# Patient Record
Sex: MALE | Race: WHITE | NOT HISPANIC OR LATINO
[De-identification: names, ages, dates, MRNs, and addresses within clinical notes are randomized per-mention and may not be internally consistent; named-entity substitution may affect disease eponyms.]

---

## 2019-10-22 ENCOUNTER — APPOINTMENT (OUTPATIENT)
Dept: VASCULAR SURGERY | Facility: CLINIC | Age: 69
End: 2019-10-22
Payer: MEDICARE

## 2019-10-22 VITALS
DIASTOLIC BLOOD PRESSURE: 71 MMHG | BODY MASS INDEX: 33.66 KG/M2 | OXYGEN SATURATION: 96 % | TEMPERATURE: 98.8 F | HEIGHT: 73 IN | WEIGHT: 254 LBS | HEART RATE: 71 BPM | SYSTOLIC BLOOD PRESSURE: 107 MMHG

## 2019-10-22 DIAGNOSIS — I83.893 VARICOSE VEINS OF BILATERAL LOWER EXTREMITIES WITH OTHER COMPLICATIONS: ICD-10-CM

## 2019-10-22 DIAGNOSIS — Z87.39 PERSONAL HISTORY OF OTHER DISEASES OF THE MUSCULOSKELETAL SYSTEM AND CONNECTIVE TISSUE: ICD-10-CM

## 2019-10-22 DIAGNOSIS — I83.93 ASYMPTOMATIC VARICOSE VEINS OF BILATERAL LOWER EXTREMITIES: ICD-10-CM

## 2019-10-22 DIAGNOSIS — Z86.39 PERSONAL HISTORY OF OTHER ENDOCRINE, NUTRITIONAL AND METABOLIC DISEASE: ICD-10-CM

## 2019-10-22 DIAGNOSIS — F17.200 NICOTINE DEPENDENCE, UNSPECIFIED, UNCOMPLICATED: ICD-10-CM

## 2019-10-22 DIAGNOSIS — Z72.89 OTHER PROBLEMS RELATED TO LIFESTYLE: ICD-10-CM

## 2019-10-22 DIAGNOSIS — I83.813 VARICOSE VEINS OF BILATERAL LOWER EXTREMITIES WITH PAIN: ICD-10-CM

## 2019-10-22 DIAGNOSIS — I83.10 VARICOSE VEINS OF UNSPECIFIED LOWER EXTREMITY WITH INFLAMMATION: ICD-10-CM

## 2019-10-22 PROCEDURE — 99203 OFFICE O/P NEW LOW 30 MIN: CPT | Mod: 25

## 2019-10-22 PROCEDURE — 93970 EXTREMITY STUDY: CPT

## 2019-10-22 RX ORDER — AMLODIPINE BESYLATE 5 MG/1
5 TABLET ORAL
Refills: 0 | Status: ACTIVE | COMMUNITY

## 2019-10-22 RX ORDER — ATORVASTATIN CALCIUM 80 MG/1
TABLET, FILM COATED ORAL
Refills: 0 | Status: ACTIVE | COMMUNITY

## 2019-10-22 NOTE — REASON FOR VISIT
[Spouse] : spouse [FreeTextEntry1] : Patient with c/o bilateral leg/calf pain, 5/10, especially at end of day and bilateral leg swelling, aches, cramps, heaviness with large bulging lower leg branch veins c/w gsv reflux.  He does not wear compression stockings and no history of venous treatment.   Patient reports right knee arthritis and plans to schedule right knee replacement by January 2020.  Per his wife, Marlene, patient has not chosen orthopedic surgeon.  VLE today confirms no DVT and no SVT bilateral leg; + reflux bilateral GSV  with dilated branch varicose veins and bilateral Baker's cyst.  Patient with dilated branch varicose veins bilateral leg with bilateral leg swelling.   Recommend thigh high medical grade 20-30 mmHg compression stockings to be worn daily and during flights.  Provided patient with prescription.  He would benefit from bilateral gsv evlt followed by possible bilateral leg microphlebectomy.  Patient will schedule right leg knee replacement first and after his knee replacement and complete recovery from knee replacement, he will schedule bilateral gsv evlt procedures.

## 2019-10-22 NOTE — DATA REVIEWED
[FreeTextEntry1] : RVT Penalo performed b/l vle - shows b/l gsv reflux b/l bakers cysts - no dvt no svt

## 2019-10-22 NOTE — PHYSICAL EXAM
[JVD] : no jugular venous distention  [2+] : left 2+ [Ankle Swelling (On Exam)] : present [Varicose Veins Of Lower Extremities] : bilaterally [Ankle Swelling Bilaterally] : severe [] : present [Ankle Swelling On The Left] : moderate [Purpura] : no purpura  [No Rash or Lesion] : No rash or lesion [Petechiae] : no petechiae [Skin Ulcer] : no ulcer [Alert] : alert [Skin Induration] : no induration [Oriented to Place] : oriented to place [Oriented to Person] : oriented to person [Oriented to Time] : oriented to time [Calm] : calm [de-identified] : wdwn nad [FreeTextEntry1] : large branch varicose veins bulging both lower legs b/l venous dermatitis  [de-identified] : tramainel [de-identified] : wnl [de-identified] : wnl [de-identified] : as  above

## 2019-10-22 NOTE — ADDENDUM
[FreeTextEntry1] : CEAP Classification:\par []     C0----No visible or palpable signs of venous disease\par []     C1----Telangiectasia or reticular veins\par []     C2----Varicose veins; distinguished from reticular veins by a diameter of 3mm or more.\par x     C3----Edema\par []     Z4o--Rjabifg in skin and subcutaneous tissues secondary to CVD---Pigmentation or eczema\par []     N6g--Hkurlql in skin and subcutaneous tissues secondary to CVD---Lipodermatosclerosis or atrophic jania\par []     C5----Healed venous ulcer\par []     C6----Active venous ulcer\par x     S ----Symptoms including ache, pain, tightness, skin irritation, heaviness, muscle cramps, and other venous dysfunction\par []     A ----Asymptomatic\par CEAP Sclore  =  C3, S\par \par \par Attribute                            Absent=0                    Mild=1                                   Moderate=2                                           Severe=3\par 2         Pain                          None                          Occasional                           Daily, moderate                                      Daily, severe\par          \par 2        Varicose Veins         None                          Few: branch VV                  Multiple: GS VV                                      Extensive: GS & LS   \par \par 2        Venous Edema         None                         Evening ankle only                Afternoon above ankle                          Morning above ankle\par \par  0       Skin Pigmentation      None or low             Diffuse, limited, old brown    Diffuse, lower 1/3, recent pigment        Above lower 1/3, and recent pigment\par \par 0        Inflammation              None                        Mild cellulitis                           Moderate cellulitis, lower 1/3                 Severe cellulitis, lower 1/3 & above\par \par 0        Induration                  None                        Focal (<5 cm)                        Medial or lateral, < lower 1/3                  Entire lower 1/3   \par \par 0        No.of active ulcers   0                              1                                             2                                                             >2\par \par 0        Active ulceration      None                       < 3 months                             > 3 months, < 1 year                              Not healed > 1 year\par \par 0        Active ulcer, size     None                       < 2 cm diameter                     2-6 cm diameter                                      > 6 cm diameter      \par \par 0        Compressive tx        No use/compliant    Intermittent use                      Wears most days                                   Full compliance\par \par \par 6      TOTAL (max 30 pts) Venous Clinical Severity Score\par

## 2019-11-27 ENCOUNTER — APPOINTMENT (OUTPATIENT)
Dept: VASCULAR SURGERY | Facility: CLINIC | Age: 69
End: 2019-11-27
Payer: MEDICARE

## 2019-11-27 VITALS
SYSTOLIC BLOOD PRESSURE: 127 MMHG | OXYGEN SATURATION: 94 % | DIASTOLIC BLOOD PRESSURE: 80 MMHG | HEART RATE: 91 BPM | TEMPERATURE: 98.2 F

## 2019-11-27 PROCEDURE — 36478 ENDOVENOUS LASER 1ST VEIN: CPT | Mod: RT

## 2019-11-27 NOTE — ADDENDUM
[FreeTextEntry1] : RN reviewed with the patient post-procedure endovenous laser ablation (EVLT) instructions, provided patient with printed copy of instructions along with Dr. Butler's cell number, and advised patient to call for any questions or concerns.\par \par Post-Endovenous Laser Ablation Therapy (EVLT) \par \par You have completed your EVLT and are on your way to better lower extremity venous health.  A medical-grade compression stocking has been placed on your leg to provide important continued support and external compression following the procedure.  \par \par The stocking is to remain in place for the next 7 days and is to be worn 24 hours/day.  This means that you need to sleep with the stocking, too.\par \par For the first three days, you cannot get the stocking wet.  Protect the stocking from getting wet.  If it gets slightly wet, let it air dry or dry with a blow dryer.  After 3 days, you may remove the stocking only to shower/bathe but then-you’ll need to replace the stocking immediately afterwards.  After 7 days you may discontinue use the support hose. \par \par You should expect to feel tightness, pulling, aching in the leg where the treated vein underwent ablation.  This is normal and represents the desired inflammatory response and the closing of the vein. Most people experience the onset of this discomfort after 2-3 days.  It may last 1-2 weeks and will get progressively better each day.  Take Motrin or lbuprofen or Aleve to help ease the discomfort.  You may also notice bruising of the skin- this is also normal and will resolve without difficulty.  Most patients who are scheduled to undergo EVLT on both legs are comfortable and more than ready to proceed with the second procedure after 2 weeks.  If it is necessary to reschedule, please notify the office.\par \par For the first week following your EVLT, avoid heavy exercise.  However, it is important that you walk as much as possible immediately after the procedure and each day for the first week.  Walking outside or on an indoor treadmill for 1 hour are both acceptable.  It is critical NOT to go home and remain sedentary following the procedure.\par \par You should immediately call your doctor if you experience:  fever, redness/blanching of the skin, excessive pain, swelling of the leg or for any concerns that you want to discuss.  \par \par You must be seen in the doctor's office for a follow-up evaluation within the first week after your EVLT and you can expect to undergo a quick ultrasound exam in the office.  This is an important but brief visit to ensure that there are no complications such as a deep vein thrombosis (blood clot). \par  \par Please contact our office to schedule your follow up appointment [phone] if you do not already have a scheduled appointment.  \par \par Dr. Butler’s cell phone number:  [phone]\par \par \par

## 2019-11-27 NOTE — PROCEDURE
[FreeTextEntry1] : Right gsv endovenous laser ablation.  [FreeTextEntry2] : Right Lower extremity varicose veins with inflammation, leg pain, leg swelling, and leg cramping.  Venous insufficiency, chronic venous hypertension and venous reflux. [FreeTextEntry3] : Mr. STEPHEN MACK is a 69 year year old M with a history of   right   lower extremity varicose veins previously seen in the office.  Ultrasound examination demonstrated reflux in the right vein dumping into the patient’s varicosities in the  right gsv  leg.  A trial of compression stockings, exercise, elevation, and pain medication was attempted without relief and as such, this patient was offered endovenous laser ablation therapy.  After explaining risks and benefits, informed consent was obtained and the patient agreed to proceed.  \par \par The patient was escorted to the procedure room and placed in the  supine  position on the examination table.  Laser safety glasses were placed on the patient.  The  right   leg was prepped and draped in the usual sterile fashion and time-out was called.  A sonographic probe was placed into a sterile sheath and the right  lower extremity was imaged.  The  right gsv vein was identified under sonographic guidance and 1 mL of 1% lidocaine was administered subcutaneously using a 25G needle.  Using standard Seldinger technique, access to the   right gsv  vein with the needle and corresponding guidewire was obtained.  The 4Fr Yadiel-Sheath introducer was advanced over the wire to the treatment location.  The position of the sheath tip is 2 cm below the Sapheno   []   Junction and verified using ultrasound guidance.  Dilator and guidewire removed.   NeverTouch laser fiber inserted into the sheath until the Fiber Stop Assembly came in contact with the end of the Sheath Hub.  Fiber stop assembly removed and pulled the sheath back and locked to the Sheath-Hannah fitting.  Fiber location confirmed using ultrasound guidance.\par \par Local tumescent anesthesia under ultrasound guidance was administered to ensure delivery of just enough anesthesia, approximately 250 mL, to achieve thermal protection.\par Anesthesia:  Tumescent anesthesia.  Tumescent anesthesia:  250 mL 0.9% Sodium Chloride for injection poured into sterile blue basin and added 83 mL sterile injectable lidocaine 1% (without Epinephrine) using 60 mL syringe.\par \par Laser was set to continuous mode and adjusted to desired power.  Laser placed in Enable mode.  The laser was activated by depressing the foot pedal while withdrawing the fiber and sheath together at a rate of 1 cm per 5 seconds.  The operating of the laser was ceased by removing the foot from foot pedal when the fiber end was 2 - 3 cm from the access site as indicated by markers on the distal tip of the Yadiel-Sheath Introducer.   The sheath and laser fiber was removed and manual pressure applied at access site for 5 minutes.  Post procedure, the vein was imaged and showed successful closure of the  right gsv   vein.  Dry dressing applied to access site, compression stocking 20 - 30 mm Hg applied to treated extremity.  The patient tolerated the procedure well with no complications.  Vital signs stable, patient was monitored throughout procedure.  The patient was escorted to the changing room.  \par \par EVLT procedure start time    144 pm\par EVLT procedure end time   158 pm   \par March  9  \par Length  32     cm\par Energy 1493    Joule\par Time    166   sec\par \par Post-Op Instructions:  The following instructions were reviewed with the patient and a print out of the instructions provided to patient at time of visit:   1)  Compression stocking to be worn 24 hours per day x 7 days.  2)  Do not get the stocking wet for the first 3 days.  3)  Ambulation immediately following procedure and as much as possible for the first 7 days.  4)  Contact the office if any questions or concerns. 5)  Patient must follow-up within the first week for post procedure reflux study performed in office.  Reviewed with the patient the follow up visit is to ensure that there are no complications such as a deep vein thrombosis (DVT).  Patient acknowledged understanding of post-op instructions and was provided with print out of instructions at time of visit.  \par \par \par

## 2019-11-27 NOTE — REASON FOR VISIT
[Procedure: _________] : a [unfilled] procedure visit [Spouse] : spouse [FreeTextEntry1] : Patient has severe reflux right gsv and here for right gsv EVLT procedure.  STEPHEN MACK proceeded with right gsv EVLT with no complications.  STEPHEN MACK will follow up in one week for routine visit with VLE.

## 2019-12-04 ENCOUNTER — APPOINTMENT (OUTPATIENT)
Dept: VASCULAR SURGERY | Facility: CLINIC | Age: 69
End: 2019-12-04
Payer: MEDICARE

## 2019-12-04 DIAGNOSIS — M79.661 PAIN IN RIGHT LOWER LEG: ICD-10-CM

## 2019-12-04 DIAGNOSIS — I83.11 VARICOSE VEINS OF RIGHT LOWER EXTREMITY WITH INFLAMMATION: ICD-10-CM

## 2019-12-04 DIAGNOSIS — I83.811 VARICOSE VEINS OF RIGHT LOWER EXTREMITY WITH PAIN: ICD-10-CM

## 2019-12-04 DIAGNOSIS — M79.89 PAIN IN RIGHT LOWER LEG: ICD-10-CM

## 2019-12-04 DIAGNOSIS — I83.891 VARICOSE VEINS OF RIGHT LOWER EXTREMITY WITH OTHER COMPLICATIONS: ICD-10-CM

## 2019-12-04 PROCEDURE — 93971 EXTREMITY STUDY: CPT

## 2019-12-04 PROCEDURE — 99214 OFFICE O/P EST MOD 30 MIN: CPT | Mod: 25

## 2019-12-04 NOTE — PHYSICAL EXAM
[JVD] : no jugular venous distention  [2+] : left 2+ [Ankle Swelling (On Exam)] : present [Varicose Veins Of Lower Extremities] : bilaterally [Ankle Swelling Bilaterally] : severe [] : bilaterally [Ankle Swelling On The Left] : moderate [No Rash or Lesion] : No rash or lesion [Purpura] : no purpura  [Petechiae] : no petechiae [Skin Ulcer] : no ulcer [Skin Induration] : no induration [Alert] : alert [Oriented to Person] : oriented to person [Oriented to Place] : oriented to place [Oriented to Time] : oriented to time [Calm] : calm [de-identified] : wdwn nad [de-identified] : wnl [de-identified] : tramainel [FreeTextEntry1] : large branch varicose veins bulging both lower legs b/l venous dermatitis  - mild bryising rt medial thigh s/p recent evlt [de-identified] : wnl [de-identified] : as  above

## 2019-12-04 NOTE — REASON FOR VISIT
[Follow-Up: _____] : a [unfilled] follow-up visit [FreeTextEntry1] : He is s/p  right gsv  EVLT on  11/27/2019.  Patient is here today for routine follow up with right VLE.  He has minor ecchymosis noted right medial thigh; reviewed with patient the bruising is normal and will resolve over time.   Right leg ultrasound today confirms no DVT, no HIIT, no truncal reflux and right gsv closed consistent with prior laser ablation procedure.  He is scheduled for left gsv on 12/11/19.

## 2019-12-04 NOTE — DATA REVIEWED
[FreeTextEntry1] : FRANCINE Brewster performed rt vle - rt gsv closed s/p recent evlt - no dvt no HIIT no truncal reflux

## 2019-12-11 ENCOUNTER — APPOINTMENT (OUTPATIENT)
Dept: VASCULAR SURGERY | Facility: CLINIC | Age: 69
End: 2019-12-11
Payer: MEDICARE

## 2019-12-11 VITALS
TEMPERATURE: 97.3 F | SYSTOLIC BLOOD PRESSURE: 113 MMHG | HEART RATE: 87 BPM | DIASTOLIC BLOOD PRESSURE: 65 MMHG | OXYGEN SATURATION: 95 %

## 2019-12-11 DIAGNOSIS — L30.9 DERMATITIS, UNSPECIFIED: ICD-10-CM

## 2019-12-11 DIAGNOSIS — M79.89 PAIN IN LEFT LOWER LEG: ICD-10-CM

## 2019-12-11 DIAGNOSIS — M79.662 PAIN IN LEFT LOWER LEG: ICD-10-CM

## 2019-12-11 PROCEDURE — 36478 ENDOVENOUS LASER 1ST VEIN: CPT | Mod: LT

## 2019-12-11 NOTE — REASON FOR VISIT
[Procedure: _________] : a [unfilled] procedure visit [FreeTextEntry1] : Patient has severe reflux left gsv and here for left gsv EVLT procedure.  STEPHEN MACK proceeded with left gsv EVLT with no complications.  STEPHEN MACK will follow up in one week for routine visit with left VLE.

## 2019-12-11 NOTE — ADDENDUM
[FreeTextEntry1] : RN reviewed with the patient post-procedure endovenous laser ablation (EVLT) instructions, provided patient with printed copy of instructions along with Dr. Butler's cell number, and advised patient to call for any questions or concerns.\par \par Post-Endovenous Laser Ablation Therapy (EVLT) \par \par You have completed your EVLT and are on your way to better lower extremity venous health.  A medical-grade compression stocking has been placed on your leg to provide important continued support and external compression following the procedure.  \par \par The stocking is to remain in place for the next 7 days and is to be worn 24 hours/day.  This means that you need to sleep with the stocking, too.\par \par For the first three days, you cannot get the stocking wet.  Protect the stocking from getting wet.  If it gets slightly wet, let it air dry or dry with a blow dryer.  After 3 days, you may remove the stocking only to shower/bathe but then-you’ll need to replace the stocking immediately afterwards.  After 7 days you may discontinue use the support hose. \par \par You should expect to feel tightness, pulling, aching in the leg where the treated vein underwent ablation.  This is normal and represents the desired inflammatory response and the closing of the vein. Most people experience the onset of this discomfort after 2-3 days.  It may last 1-2 weeks and will get progressively better each day.  Take Motrin or lbuprofen or Aleve to help ease the discomfort.  You may also notice bruising of the skin- this is also normal and will resolve without difficulty.  Most patients who are scheduled to undergo EVLT on both legs are comfortable and more than ready to proceed with the second procedure after 2 weeks.  If it is necessary to reschedule, please notify the office.\par \par For the first week following your EVLT, avoid heavy exercise.  However, it is important that you walk as much as possible immediately after the procedure and each day for the first week.  Walking outside or on an indoor treadmill for 1 hour are both acceptable.  It is critical NOT to go home and remain sedentary following the procedure.\par \par You should immediately call your doctor if you experience:  fever, redness/blanching of the skin, excessive pain, swelling of the leg or for any concerns that you want to discuss.  \par \par You must be seen in the doctor's office for a follow-up evaluation within the first week after your EVLT and you can expect to undergo a quick ultrasound exam in the office.  This is an important but brief visit to ensure that there are no complications such as a deep vein thrombosis (blood clot). \par  \par Please contact our office to schedule your follow up appointment [phone] if you do not already have a scheduled appointment.  \par \par Dr. Butler’s cell phone number:  [phone]\par \par \par

## 2019-12-11 NOTE — PROCEDURE
[FreeTextEntry1] : Left gsv endovenous laser ablation.  [FreeTextEntry2] : Left lower extremity varicose veins with inflammation, leg pain, leg swelling, and leg cramping.  Venous insufficiency, chronic venous hypertension and venous reflux. [FreeTextEntry3] : Mr. STEPHEN MACK is a 69 year year old M with a history of  left lower extremity varicose veins previously seen in the office.  Ultrasound examination demonstrated reflux in the left gsv vein dumping into the patient’s varicosities in the left  leg.  A trial of compression stockings, exercise, elevation, and pain medication was attempted without relief and as such, this patient was offered endovenous laser ablation therapy.  After explaining risks and benefits, informed consent was obtained and the patient agreed to proceed.  \par \par The patient was escorted to the procedure room and placed in the supine  position on the examination table.  Laser safety glasses were placed on the patient.  The  left  leg was prepped and draped in the usual sterile fashion and time-out was called.  A sonographic probe was placed into a sterile sheath and the  left  lower extremity was imaged.  The left gsv  vein was identified under sonographic guidance and 1 mL of 1% lidocaine was administered subcutaneously using a 25G needle.  Using standard Seldinger technique, access to the  left gsv  vein with the needle and corresponding guidewire was obtained.  The 4Fr Yadiel-Sheath introducer was advanced over the wire to the treatment location.  The position of the sheath tip is 2 cm below the Sapheno  femoral   Junction and verified using ultrasound guidance.  Dilator and guidewire removed.   NeverTouch laser fiber inserted into the sheath until the Fiber Stop Assembly came in contact with the end of the Sheath Hub.  Fiber stop assembly removed and pulled the sheath back and locked to the Sheath-Hannah fitting.  Fiber location confirmed using ultrasound guidance.\par \par Local tumescent anesthesia under ultrasound guidance was administered to ensure delivery of just enough anesthesia, approximately 250 mL, to achieve thermal protection.\par Anesthesia:  Tumescent anesthesia.  Tumescent anesthesia:  250 mL 0.9% Sodium Chloride for injection poured into sterile blue basin and added 83 mL sterile injectable lidocaine 1% (without Epinephrine) using 60 mL syringe.\par \par Laser was set to continuous mode and adjusted to desired power.  Laser placed in Enable mode.  The laser was activated by depressing the foot pedal while withdrawing the fiber and sheath together at a rate of 1 cm per 5 seconds.  The operating of the laser was ceased by removing the foot from foot pedal when the fiber end was 2 - 3 cm from the access site as indicated by markers on the distal tip of the Yadiel-Sheath Introducer.   The sheath and laser fiber was removed and manual pressure applied at access site for 5 minutes.  Post procedure, the vein was imaged and showed successful closure of the left gsv  vein.  Dry dressing applied to access site, compression stocking 20 - 30 mm Hg applied to treated extremity.  The patient tolerated the procedure well with no complications.  Vital signs stable, patient was monitored throughout procedure.  The patient was escorted to the changing room.  \par \par EVLT procedure start time   1148 am \par EVLT procedure end time   1202 pm  \par March 10   \par Length 33    cm\par Energy    1582   Joule\par Time   158     sec\par \par Post-Op Instructions:  The following instructions were reviewed with the patient and a print out of the instructions provided to patient at time of visit:   1)  Compression stocking to be worn 24 hours per day x 7 days.  2)  Do not get the stocking wet for the first 3 days.  3)  Ambulation immediately following procedure and as much as possible for the first 7 days.  4)  Contact the office if any questions or concerns. 5)  Patient must follow-up within the first week for post procedure reflux study performed in office.  Reviewed with the patient the follow up visit is to ensure that there are no complications such as a deep vein thrombosis (DVT).  Patient acknowledged understanding of post-op instructions and was provided with print out of instructions at time of visit.  \par \par \par

## 2019-12-18 ENCOUNTER — APPOINTMENT (OUTPATIENT)
Dept: VASCULAR SURGERY | Facility: CLINIC | Age: 69
End: 2019-12-18
Payer: MEDICARE

## 2019-12-18 VITALS
HEART RATE: 85 BPM | SYSTOLIC BLOOD PRESSURE: 137 MMHG | OXYGEN SATURATION: 92 % | TEMPERATURE: 98.9 F | DIASTOLIC BLOOD PRESSURE: 75 MMHG

## 2019-12-18 DIAGNOSIS — I83.812 VARICOSE VEINS OF LEFT LOWER EXTREMITY WITH PAIN: ICD-10-CM

## 2019-12-18 DIAGNOSIS — I87.2 VENOUS INSUFFICIENCY (CHRONIC) (PERIPHERAL): ICD-10-CM

## 2019-12-18 DIAGNOSIS — I83.892 VARICOSE VEINS OF LEFT LOWER EXTREMITY WITH OTHER COMPLICATIONS: ICD-10-CM

## 2019-12-18 DIAGNOSIS — I83.12 VARICOSE VEINS OF LEFT LOWER EXTREMITY WITH INFLAMMATION: ICD-10-CM

## 2019-12-18 PROCEDURE — 99214 OFFICE O/P EST MOD 30 MIN: CPT | Mod: 25

## 2019-12-18 PROCEDURE — 93971 EXTREMITY STUDY: CPT

## 2019-12-18 NOTE — PHYSICAL EXAM
[JVD] : no jugular venous distention  [2+] : right 2+ [Ankle Swelling (On Exam)] : present [] : present [Ankle Swelling Bilaterally] : severe [Varicose Veins Of Lower Extremities] : bilaterally [Ankle Swelling On The Left] : moderate [No Rash or Lesion] : No rash or lesion [Purpura] : no purpura  [Petechiae] : no petechiae [Skin Induration] : no induration [Skin Ulcer] : no ulcer [Alert] : alert [Oriented to Place] : oriented to place [Oriented to Person] : oriented to person [Calm] : calm [Oriented to Time] : oriented to time [de-identified] : wdwn nad [de-identified] : tramainel [de-identified] : wnl [FreeTextEntry1] : large branch varicose veins bulging both lower legs b/l venous dermatitis  - mild bruising left medial thigh s/p recent evlt [de-identified] : wnl [de-identified] : as  above

## 2019-12-18 NOTE — REASON FOR VISIT
[Follow-Up: _____] : a [unfilled] follow-up visit [FreeTextEntry1] : He is s/p left gsv EVLT on 12/11/2019.  Patient is here today for routine follow up with left VLE.   Left leg ultrasound confirms no dvt, no HIIT and no truncal reflux. At this time, pt is very satisfied with results of b/l gsv evlt and does not wish to proceed with stab phlebectomy. recommended continued use of support hose for prolonged periods of standing and sitting.

## 2019-12-18 NOTE — DATA REVIEWED
[FreeTextEntry1] : RVT Penalo performed left gsv vle s/p recent left evlt - left gsv closed no dvt no HIIT sf junction patent and compressible